# Patient Record
Sex: MALE | Race: WHITE
[De-identification: names, ages, dates, MRNs, and addresses within clinical notes are randomized per-mention and may not be internally consistent; named-entity substitution may affect disease eponyms.]

---

## 2018-06-30 ENCOUNTER — HOSPITAL ENCOUNTER (EMERGENCY)
Dept: HOSPITAL 80 - CED | Age: 38
Discharge: HOME | End: 2018-06-30
Payer: COMMERCIAL

## 2018-06-30 VITALS — DIASTOLIC BLOOD PRESSURE: 84 MMHG | SYSTOLIC BLOOD PRESSURE: 120 MMHG

## 2018-06-30 DIAGNOSIS — E86.0: Primary | ICD-10-CM

## 2018-06-30 DIAGNOSIS — F17.200: ICD-10-CM

## 2018-06-30 DIAGNOSIS — I10: ICD-10-CM

## 2018-06-30 NOTE — EDPHY
H & P


Stated Complaint: Fatigue and "feeling dehydrated" x 1 week.


Time Seen by Provider: 06/30/18 13:08


HPI/ROS: 





CHIEF COMPLAINT:  Fatigue, feels that he is dehydrated





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  He 

recently completed a 28 day stent of working 12 hr shifts.  It has been 

extremely hot and although he tries to keep up with his water intake he feels 

that he has not done so.  He has a few days off right now but has to return to 

work day after tomorrow.  He has had difficulty sleeping although he feels 

quite fatigued.  He suspects that he is dehydrated and tells me that in the 

past he felt similarly and was treated successfully with IV fluids.  He is 

requesting IV fluids today.  He takes Benadryl regularly for treatment of hives 

related to seasonal allergies.  He denies any recent nausea or vomiting, 

alcohol intake, abdominal pain, or blood in his stool.  No difficulty 

urinating.  Occasional diarrhea.





He has had elevated blood pressure readings in the past but has not taken 

medication for hypertension.





REVIEW OF SYSTEMS:


A ten point review of systems was performed and is negative with the exception 

of the items mentioned in the HPI.





Past medical history:  Hypertension, thrombocytopenia noted when blood is drawn

, seasonal allergies





Past surgical history:  Surgery on both of his index fingers for traumatic 

injury





Social history:  He works on an oil rig.  He does not use alcohol or illicit 

drugs.  He smokes 1 pack of cigarettes daily.





General Appearance:  Alert.  Vital signs reviewed.  Initial blood pressure 159/

88.


Eyes:  Pupils equal and round, no conjunctival injection, no discharge. 

Anicteric.


ENT, Mouth:  Mucous membranes are moist, no oropharyngeal erythema or edema.


Neck:  No lymphadenopathy, supple.


Respiratory:  Lungs are clear to auscultation; no wheezes, rales, or rhonchi.


Cardiovascular:  Regular rate and rhythm; no murmur, rub, or gallop.


Gastrointestinal:  Abdomen is soft and nontender, no masses or organomegaly, 

bowel sounds normal.


Skin:  Warm and dry, no rashes on exposed skin, normal color.


Back:  Nontender to palpation over the thoracolumbar spine. No CVAT.


Extremities:  No lower extremity edema, no calf tenderness or swelling.


Neurological:  Alert and oriented.  Moving all four extremities easily and 

equally.


Psychiatric:  Normal affect.





- Personal History


Current Tetanus Diphtheria and Acellular Pertussis (TDAP): Yes


Tetanus Vaccine Date: within 10 years





- Medical/Surgical History


Hx Asthma: No


Hx Chronic Respiratory Disease: No


Hx Diabetes: No


Hx Cardiac Disease: No


Hx Renal Disease: No


Hx Cirrhosis: No


Hx Alcoholism: No


Hx HIV/AIDS: No


Hx Splenectomy or Spleen Trauma: No


Other PMH: ADD, partial finger amputation.  kidney issues, HTN no meds





- Social History


Smoking Status: Current every day smoker


Constitutional: 


 Initial Vital Signs











Temperature (C)  36.8 C   06/30/18 13:09


 


Heart Rate  98   06/30/18 13:09


 


Respiratory Rate  18   06/30/18 13:09


 


Blood Pressure  159/88 H  06/30/18 13:09


 


O2 Sat (%)  94   06/30/18 13:09








 











O2 Delivery Mode               Room Air














Allergies/Adverse Reactions: 


 





No Known Allergies Allergy (Verified 06/30/18 13:09)


 








Home Medications: 














 Medication  Instructions  Recorded


 


NK [No Known Home Meds]  06/30/18














Medical Decision Making


Differential Diagnosis: 





MDM:  37-year-old male with possible mild dehydration who is requesting IV 

hydration.  He was treated successfully this way in the past and feels that it 

will benefit him.  He is concerned because he has to return to work in a day 

and half--he was expecting to have more time off and does not feel that he has 

sufficiently recovered from his recent 28 day stint.  He does not feel that he 

can adequately orally rehydrate.





He was given 2 L of IV normal saline in the emergency department.  Electrolytes 

were reviewed.











- Data Points


Laboratory Results: 


 











  06/30/18





  13:44


 


POC Sodium  140 mEq/L mEq/L





   (135-145) 


 


POC Potassium  3.5 mEq/L mEq/L





   (3.3-5.0) 


 


POC Chloride  104.0 mEq/L mEq/L





   () 


 


POC Total CO2  26 mEq/L mEq/L





   (22-31) 


 


POC BUN  8 mg/dL mg/dL





   (7-23) 


 


POC Creatinine  1.3 mg/dL mg/dL





   (0.7-1.3) 


 


POC Glucose  125 mg/dL H mg/dL





   () 


 


POC Calcium  9.0 mg/dL mg/dL





   (8.5-10.4) 











Medications Given: 


 








Discontinued Medications





Sodium Chloride (Ns)  1,000 mls @ 0 mls/hr IV ONCE ONE


   PRN Reason: Wide Open


   Stop: 06/30/18 13:24


   Last Admin: 06/30/18 13:24 Dose:  1,000 mls





Point of Care Test Results: 


 Chemistry











  06/30/18





  13:44


 


POC Sodium  140 mEq/L mEq/L





   (135-145) 


 


POC Potassium  3.5 mEq/L mEq/L





   (3.3-5.0) 


 


POC Chloride  104.0 mEq/L mEq/L





   () 


 


POC Total CO2  26 mEq/L mEq/L





   (22-31) 


 


POC BUN  8 mg/dL mg/dL





   (7-23) 


 


POC Creatinine  1.3 mg/dL mg/dL





   (0.7-1.3) 


 


POC Glucose  125 mg/dL H mg/dL





   () 


 


POC Calcium  9.0 mg/dL mg/dL





   (8.5-10.4) 














Departure





- Departure


Disposition: Home, Routine, Self-Care


Clinical Impression: 


 Dehydration





Hypertension


Qualifiers:


 Hypertension type: essential hypertension Qualified Code(s): I10 - Essential (

primary) hypertension





Condition: Good


Instructions:  Dehydration (ED), Hypertension (ED)


Additional Instructions: 


Continue orally rehydrating with water and Gatorade.





You might try Flonase for your seasonal allergies.  You can buy this over-the-

counter.  I also recommend Sarna lotion for itching related hives.  You can buy 

this over-the-counter too.  Please do not exceed the recommended daily dose of 

Benadryl.





Please her blood pressure followed up by your primary care physician.  Your 

initial blood pressure was 159/88.


Referrals: 


ANETTE BEST PA-C [Other] - As per Instructions